# Patient Record
(demographics unavailable — no encounter records)

---

## 2025-03-05 NOTE — HISTORY OF PRESENT ILLNESS
[FreeTextEntry1] : Here for follow up and compl of 1 mo of dizziness intermittently. No assoc symptoms.  Not presyncope.  Occurs standing or sitting, somewhat more with turning his head. He saw ENT and was ref to neuro he did not see yet. He saw his eye doctor and had rx changed about 1 mo ago. He tried stopping losartan and statin for few days and thinks it helped. BP at home around 120s/80s.

## 2025-03-05 NOTE — PHYSICAL EXAM
[Well Developed] : well developed [Well Nourished] : well nourished [No Acute Distress] : no acute distress [Normal Conjunctiva] : normal conjunctiva [Normal Venous Pressure] : normal venous pressure [No Carotid Bruit] : no carotid bruit [Normal S1, S2] : normal S1, S2 [No Murmur] : no murmur [No Rub] : no rub [No Gallop] : no gallop [Good Air Entry] : good air entry [No Respiratory Distress] : no respiratory distress  [Soft] : abdomen soft [Non Tender] : non-tender [No Masses/organomegaly] : no masses/organomegaly [Normal Bowel Sounds] : normal bowel sounds [Normal Gait] : normal gait [No Edema] : no edema [No Cyanosis] : no cyanosis [No Clubbing] : no clubbing [No Varicosities] : no varicosities [No Rash] : no rash [No Skin Lesions] : no skin lesions [Moves all extremities] : moves all extremities [No Focal Deficits] : no focal deficits [Normal Speech] : normal speech [Alert and Oriented] : alert and oriented [Normal memory] : normal memory [de-identified] : dry crackles bibas

## 2025-03-05 NOTE — DISCUSSION/SUMMARY
[FreeTextEntry1] : Advised to see neuro as ENT had referred. Advised I am OK stopping Losartan for 1 week and to call with update although I do not think related as BP not low and not orthostatic. Keep well hydrated Check labs Sched carotid doppler 6 mo  [EKG obtained to assist in diagnosis and management of assessed problem(s)] : EKG obtained to assist in diagnosis and management of assessed problem(s)